# Patient Record
Sex: MALE | Race: OTHER | Employment: UNEMPLOYED | URBAN - METROPOLITAN AREA
[De-identification: names, ages, dates, MRNs, and addresses within clinical notes are randomized per-mention and may not be internally consistent; named-entity substitution may affect disease eponyms.]

---

## 2020-11-14 ENCOUNTER — HOSPITAL ENCOUNTER (EMERGENCY)
Age: 30
Discharge: HOME | End: 2020-11-14
Attending: EMERGENCY MEDICINE

## 2020-11-14 ENCOUNTER — APPOINTMENT (OUTPATIENT)
Dept: GENERAL RADIOLOGY | Age: 30
End: 2020-11-14

## 2020-11-14 VITALS
BODY MASS INDEX: 38.36 KG/M2 | RESPIRATION RATE: 16 BRPM | WEIGHT: 315 LBS | HEIGHT: 76 IN | TEMPERATURE: 98.9 F | OXYGEN SATURATION: 97 % | SYSTOLIC BLOOD PRESSURE: 138 MMHG | DIASTOLIC BLOOD PRESSURE: 93 MMHG | HEART RATE: 94 BPM

## 2020-11-14 LAB
ANION GAP SERPL CALCULATED.3IONS-SCNC: 10 MMOL/L (ref 7–16)
BASOPHILS ABSOLUTE: 0.06 E9/L (ref 0–0.2)
BASOPHILS RELATIVE PERCENT: 0.7 % (ref 0–2)
BETA-HYDROXYBUTYRATE: 0.13 MMOL/L (ref 0.02–0.27)
BUN BLDV-MCNC: 12 MG/DL (ref 6–20)
CALCIUM SERPL-MCNC: 9.4 MG/DL (ref 8.6–10.2)
CHLORIDE BLD-SCNC: 98 MMOL/L (ref 98–107)
CHP ED QC CHECK: NORMAL
CO2: 26 MMOL/L (ref 22–29)
CREAT SERPL-MCNC: 0.7 MG/DL (ref 0.7–1.2)
EOSINOPHILS ABSOLUTE: 0.21 E9/L (ref 0.05–0.5)
EOSINOPHILS RELATIVE PERCENT: 2.5 % (ref 0–6)
GFR AFRICAN AMERICAN: >60
GFR NON-AFRICAN AMERICAN: >60 ML/MIN/1.73
GLUCOSE BLD-MCNC: 351 MG/DL
GLUCOSE BLD-MCNC: 355 MG/DL (ref 74–99)
HCT VFR BLD CALC: 41.9 % (ref 37–54)
HEMOGLOBIN: 14.3 G/DL (ref 12.5–16.5)
IMMATURE GRANULOCYTES #: 0.04 E9/L
IMMATURE GRANULOCYTES %: 0.5 % (ref 0–5)
LACTIC ACID: 1 MMOL/L (ref 0.5–2.2)
LIPASE: 38 U/L (ref 13–60)
LYMPHOCYTES ABSOLUTE: 3.47 E9/L (ref 1.5–4)
LYMPHOCYTES RELATIVE PERCENT: 41.7 % (ref 20–42)
MCH RBC QN AUTO: 27.9 PG (ref 26–35)
MCHC RBC AUTO-ENTMCNC: 34.1 % (ref 32–34.5)
MCV RBC AUTO: 81.8 FL (ref 80–99.9)
METER GLUCOSE: 266 MG/DL (ref 74–99)
METER GLUCOSE: 351 MG/DL (ref 74–99)
MONOCYTES ABSOLUTE: 0.54 E9/L (ref 0.1–0.95)
MONOCYTES RELATIVE PERCENT: 6.5 % (ref 2–12)
NEUTROPHILS ABSOLUTE: 4.01 E9/L (ref 1.8–7.3)
NEUTROPHILS RELATIVE PERCENT: 48.1 % (ref 43–80)
PDW BLD-RTO: 13.1 FL (ref 11.5–15)
PH VENOUS: 7.39 (ref 7.35–7.45)
PLATELET # BLD: 304 E9/L (ref 130–450)
PMV BLD AUTO: 11.2 FL (ref 7–12)
POTASSIUM REFLEX MAGNESIUM: 3.7 MMOL/L (ref 3.5–5)
RBC # BLD: 5.12 E12/L (ref 3.8–5.8)
SODIUM BLD-SCNC: 134 MMOL/L (ref 132–146)
TROPONIN: <0.01 NG/ML (ref 0–0.03)
WBC # BLD: 8.3 E9/L (ref 4.5–11.5)

## 2020-11-14 PROCEDURE — 84484 ASSAY OF TROPONIN QUANT: CPT

## 2020-11-14 PROCEDURE — 85025 COMPLETE CBC W/AUTO DIFF WBC: CPT

## 2020-11-14 PROCEDURE — 93005 ELECTROCARDIOGRAM TRACING: CPT | Performed by: STUDENT IN AN ORGANIZED HEALTH CARE EDUCATION/TRAINING PROGRAM

## 2020-11-14 PROCEDURE — 80048 BASIC METABOLIC PNL TOTAL CA: CPT

## 2020-11-14 PROCEDURE — 82962 GLUCOSE BLOOD TEST: CPT

## 2020-11-14 PROCEDURE — 71045 X-RAY EXAM CHEST 1 VIEW: CPT

## 2020-11-14 PROCEDURE — 99283 EMERGENCY DEPT VISIT LOW MDM: CPT

## 2020-11-14 PROCEDURE — 83690 ASSAY OF LIPASE: CPT

## 2020-11-14 PROCEDURE — 82800 BLOOD PH: CPT

## 2020-11-14 PROCEDURE — 82010 KETONE BODYS QUAN: CPT

## 2020-11-14 PROCEDURE — 83605 ASSAY OF LACTIC ACID: CPT

## 2020-11-14 RX ORDER — 0.9 % SODIUM CHLORIDE 0.9 %
1000 INTRAVENOUS SOLUTION INTRAVENOUS ONCE
Status: DISCONTINUED | OUTPATIENT
Start: 2020-11-14 | End: 2020-11-14 | Stop reason: HOSPADM

## 2020-11-14 RX ORDER — GLIPIZIDE 10 MG/1
10 TABLET ORAL
COMMUNITY
End: 2020-11-14 | Stop reason: SDUPTHER

## 2020-11-14 RX ORDER — GLIPIZIDE 10 MG/1
10 TABLET ORAL
Qty: 60 TABLET | Refills: 0 | Status: SHIPPED | OUTPATIENT
Start: 2020-11-14

## 2020-11-14 ASSESSMENT — PAIN DESCRIPTION - ORIENTATION: ORIENTATION: RIGHT

## 2020-11-14 ASSESSMENT — PAIN SCALES - GENERAL: PAINLEVEL_OUTOF10: 8

## 2020-11-14 ASSESSMENT — PAIN DESCRIPTION - PAIN TYPE: TYPE: ACUTE PAIN

## 2020-11-14 ASSESSMENT — ENCOUNTER SYMPTOMS
SHORTNESS OF BREATH: 0
NAUSEA: 0
ABDOMINAL PAIN: 0
COUGH: 0
WHEEZING: 0
BACK PAIN: 1
VOMITING: 0
EYE PAIN: 0
RHINORRHEA: 0
DIARRHEA: 0

## 2020-11-14 ASSESSMENT — PAIN DESCRIPTION - DESCRIPTORS: DESCRIPTORS: STABBING

## 2020-11-14 NOTE — ED PROVIDER NOTES
Meryle Glow is a 31 y.o. male presenting to the ED for hyperglycemia. Patient states he has been out of his Metformin and insulin for 3 weeks. His physician is in [facility] and is unsure if he has type I or type 2 diabetes. He states his glucoses been running in the 300s and was concerned today because he was in 400s. Patient is Malay-speaking so a  was used. He also admits to diffuse chest pain that radiates to his back and trapezoid muscles. He lifts a lot at his job and states his pain is made worse in these areas when he is lifting things or with palpation of his chest or back. The complaint is mild in severity. They deny alleviating factors. Patient has a medical history as listed below:   Past Medical History:   Diagnosis Date    Asthma      There are no active problems to display for this patient. Review of Systems   Constitutional: Negative for chills and fever. HENT: Negative for congestion and rhinorrhea. Eyes: Negative for pain and visual disturbance. Respiratory: Negative for cough, shortness of breath and wheezing. Cardiovascular: Positive for chest pain (Pectoral pain). Negative for leg swelling. Gastrointestinal: Negative for abdominal pain, diarrhea, nausea and vomiting. Endocrine:        Hyperglycemia   Genitourinary: Negative for dysuria, frequency and hematuria. Musculoskeletal: Positive for back pain and myalgias (Back musculature pain). Negative for arthralgias and neck pain. Neurological: Negative for numbness and headaches. Physical Exam  Vitals signs and nursing note reviewed. Constitutional:       General: He is not in acute distress. Appearance: He is well-developed. HENT:      Head: Normocephalic and atraumatic. Nose: Nose normal.      Mouth/Throat:      Pharynx: Oropharynx is clear. Eyes:      Conjunctiva/sclera: Conjunctivae normal.      Pupils: Pupils are equal, round, and reactive to light. Neck:      Musculoskeletal: Normal range of motion. Thyroid: No thyromegaly. Vascular: No JVD. Cardiovascular:      Rate and Rhythm: Normal rate and regular rhythm. Heart sounds: Normal heart sounds. Pulmonary:      Effort: Pulmonary effort is normal. No respiratory distress. Breath sounds: Normal breath sounds. No wheezing, rhonchi or rales. Chest:      Chest wall: No deformity, swelling, tenderness, crepitus or edema. Breasts:         Right: Tenderness present. Left: Tenderness present. Abdominal:      General: Abdomen is flat. There is no distension. Palpations: Abdomen is soft. There is no mass. Tenderness: There is no abdominal tenderness. There is no guarding or rebound. Musculoskeletal:         General: Tenderness present. Right shoulder: He exhibits tenderness and pain. He exhibits normal range of motion, no bony tenderness, no swelling, no effusion, no crepitus, no deformity and normal strength. Left shoulder: He exhibits decreased range of motion, tenderness and pain. He exhibits no bony tenderness, no swelling, no effusion, no crepitus, no deformity, no laceration and normal strength. Skin:     General: Skin is warm and dry. Findings: No rash. Neurological:      Mental Status: He is alert. Mental status is at baseline. Procedures     MDM           Patient presents to the ED for evaluation. This patient was seen and evaluated with the attending. Work-up was performed with concerns for but not limited to DKA, medication noncompliance, infection. Musculoskeletal pain. Patient's chest pain and his back pain are related to his new job and him lifting. His pain is made worse with lifting as he stated at bedside. He was tender to the musculature where he stated he had pain. He was worked up with concern for DKA. He was finally be hyperglycemic.   Since he does not have an established physician we have had him follow-up with her primary care service after giving him fluids here in the ED to help lower his blood sugar. He was then given prescriptions since he ran out of his since he ran out of his [place] to carry him over until he can establish care here in the area. Patient continues to be non-toxic on re-evaluation. Findings were discussed with the patient and reasons to immediately return to the ED were articulated to them. They will follow-up with their PMD.     EKG read by me. Normal sinus rhythm. Nonspecific ST-T wave abnormalities. No STEMI. No previous EKG for comparison. --------------------------------------------- PAST HISTORY ---------------------------------------------  Past Medical History:  has a past medical history of Asthma. Past Surgical History:  has a past surgical history that includes fracture surgery. Social History:  reports that he has never smoked. He has never used smokeless tobacco. He reports previous alcohol use. He reports that he does not use drugs. Family History: family history is not on file. The patients home medications have been reviewed. Allergies: Contrast [iodides];  Demerol hcl [meperidine]; and Iodine-methyl salicylate    -------------------------------------------------- RESULTS -------------------------------------------------  Labs:  Results for orders placed or performed during the hospital encounter of 11/14/20   CBC Auto Differential   Result Value Ref Range    WBC 8.3 4.5 - 11.5 E9/L    RBC 5.12 3.80 - 5.80 E12/L    Hemoglobin 14.3 12.5 - 16.5 g/dL    Hematocrit 41.9 37.0 - 54.0 %    MCV 81.8 80.0 - 99.9 fL    MCH 27.9 26.0 - 35.0 pg    MCHC 34.1 32.0 - 34.5 %    RDW 13.1 11.5 - 15.0 fL    Platelets 638 956 - 573 E9/L    MPV 11.2 7.0 - 12.0 fL    Neutrophils % 48.1 43.0 - 80.0 %    Immature Granulocytes % 0.5 0.0 - 5.0 %    Lymphocytes % 41.7 20.0 - 42.0 %    Monocytes % 6.5 2.0 - 12.0 %    Eosinophils % 2.5 0.0 - 6.0 %    Basophils % 0.7 0.0 - 2.0 % Resp 16   Ht 6' 4\" (1.93 m)   Wt (!) 320 lb (145.2 kg)   SpO2 97%   BMI 38.95 kg/m²           --------------------------------- ADDITIONAL PROVIDER NOTES ---------------------------------  At this time the patient is without objective evidence of an acute process requiring hospitalization or inpatient management. They have remained hemodynamically stable throughout their entire ED visit and are stable for discharge with outpatient follow-up. The plan has been discussed in detail and they are aware of the specific conditions for emergent return, as well as the importance of follow-up. Current Discharge Medication List          Diagnosis:  1. Hyperglycemia        Disposition:  Patient's disposition: Discharge  Patient's condition is stable. NOTE:  This report was transcribed using voice recognition software. Efforts were made to ensure accuracy; however, inadvertent computerized transcription errors may be present.         Ann-Marie Patterson, DO  Resident  11/14/20 [id number]

## 2020-11-15 LAB
EKG ATRIAL RATE: 81 BPM
EKG P AXIS: 62 DEGREES
EKG P-R INTERVAL: 182 MS
EKG Q-T INTERVAL: 350 MS
EKG QRS DURATION: 70 MS
EKG QTC CALCULATION (BAZETT): 406 MS
EKG R AXIS: 41 DEGREES
EKG T AXIS: 7 DEGREES
EKG VENTRICULAR RATE: 81 BPM

## 2021-01-31 ENCOUNTER — HOSPITAL ENCOUNTER (EMERGENCY)
Age: 31
Discharge: HOME | End: 2021-02-01
Attending: EMERGENCY MEDICINE
Payer: OTHER GOVERNMENT

## 2021-01-31 ENCOUNTER — APPOINTMENT (OUTPATIENT)
Dept: CT IMAGING | Age: 31
End: 2021-01-31
Payer: OTHER GOVERNMENT

## 2021-01-31 ENCOUNTER — APPOINTMENT (OUTPATIENT)
Dept: GENERAL RADIOLOGY | Age: 31
End: 2021-01-31
Payer: OTHER GOVERNMENT

## 2021-01-31 DIAGNOSIS — U07.1 COVID-19: Primary | ICD-10-CM

## 2021-01-31 DIAGNOSIS — R11.0 NAUSEA: ICD-10-CM

## 2021-01-31 DIAGNOSIS — J45.21 MILD INTERMITTENT ASTHMA WITH EXACERBATION: ICD-10-CM

## 2021-01-31 LAB
ALBUMIN SERPL-MCNC: 3.6 G/DL (ref 3.5–5.2)
ALP BLD-CCNC: 125 U/L (ref 40–129)
ALT SERPL-CCNC: 35 U/L (ref 0–40)
ANION GAP SERPL CALCULATED.3IONS-SCNC: 13 MMOL/L (ref 7–16)
AST SERPL-CCNC: 47 U/L (ref 0–39)
BASOPHILS ABSOLUTE: 0.01 E9/L (ref 0–0.2)
BASOPHILS RELATIVE PERCENT: 0.1 % (ref 0–2)
BETA-HYDROXYBUTYRATE: 0.63 MMOL/L (ref 0.02–0.27)
BILIRUB SERPL-MCNC: 0.5 MG/DL (ref 0–1.2)
BILIRUBIN DIRECT: <0.2 MG/DL (ref 0–0.3)
BILIRUBIN, INDIRECT: ABNORMAL MG/DL (ref 0–1)
BUN BLDV-MCNC: 9 MG/DL (ref 6–20)
CALCIUM SERPL-MCNC: 8.7 MG/DL (ref 8.6–10.2)
CHLORIDE BLD-SCNC: 95 MMOL/L (ref 98–107)
CO2: 21 MMOL/L (ref 22–29)
CREAT SERPL-MCNC: 0.7 MG/DL (ref 0.7–1.2)
EOSINOPHILS ABSOLUTE: 0.01 E9/L (ref 0.05–0.5)
EOSINOPHILS RELATIVE PERCENT: 0.1 % (ref 0–6)
GFR AFRICAN AMERICAN: >60
GFR NON-AFRICAN AMERICAN: >60 ML/MIN/1.73
GLUCOSE BLD-MCNC: 325 MG/DL (ref 74–99)
HCT VFR BLD CALC: 42 % (ref 37–54)
HEMOGLOBIN: 13.8 G/DL (ref 12.5–16.5)
IMMATURE GRANULOCYTES #: 0.04 E9/L
IMMATURE GRANULOCYTES %: 0.5 % (ref 0–5)
LIPASE: 35 U/L (ref 13–60)
LYMPHOCYTES ABSOLUTE: 1.63 E9/L (ref 1.5–4)
LYMPHOCYTES RELATIVE PERCENT: 20.2 % (ref 20–42)
MCH RBC QN AUTO: 26.4 PG (ref 26–35)
MCHC RBC AUTO-ENTMCNC: 32.9 % (ref 32–34.5)
MCV RBC AUTO: 80.3 FL (ref 80–99.9)
MONOCYTES ABSOLUTE: 0.33 E9/L (ref 0.1–0.95)
MONOCYTES RELATIVE PERCENT: 4.1 % (ref 2–12)
NEUTROPHILS ABSOLUTE: 6.05 E9/L (ref 1.8–7.3)
NEUTROPHILS RELATIVE PERCENT: 75 % (ref 43–80)
PDW BLD-RTO: 12.7 FL (ref 11.5–15)
PH VENOUS: 7.53 (ref 7.35–7.45)
PLATELET # BLD: 301 E9/L (ref 130–450)
PMV BLD AUTO: 10.9 FL (ref 7–12)
POTASSIUM SERPL-SCNC: 3.8 MMOL/L (ref 3.5–5)
PRO-BNP: 31 PG/ML (ref 0–125)
RBC # BLD: 5.23 E12/L (ref 3.8–5.8)
SARS-COV-2, NAAT: DETECTED
SODIUM BLD-SCNC: 129 MMOL/L (ref 132–146)
TOTAL PROTEIN: 8.2 G/DL (ref 6.4–8.3)
TROPONIN: <0.01 NG/ML (ref 0–0.03)
WBC # BLD: 8.1 E9/L (ref 4.5–11.5)

## 2021-01-31 PROCEDURE — 96372 THER/PROPH/DIAG INJ SC/IM: CPT

## 2021-01-31 PROCEDURE — 6360000002 HC RX W HCPCS: Performed by: EMERGENCY MEDICINE

## 2021-01-31 PROCEDURE — 82800 BLOOD PH: CPT

## 2021-01-31 PROCEDURE — 96365 THER/PROPH/DIAG IV INF INIT: CPT

## 2021-01-31 PROCEDURE — 93005 ELECTROCARDIOGRAM TRACING: CPT | Performed by: EMERGENCY MEDICINE

## 2021-01-31 PROCEDURE — 99284 EMERGENCY DEPT VISIT MOD MDM: CPT

## 2021-01-31 PROCEDURE — 96375 TX/PRO/DX INJ NEW DRUG ADDON: CPT

## 2021-01-31 PROCEDURE — 80048 BASIC METABOLIC PNL TOTAL CA: CPT

## 2021-01-31 PROCEDURE — 6360000004 HC RX CONTRAST MEDICATION: Performed by: RADIOLOGY

## 2021-01-31 PROCEDURE — 85025 COMPLETE CBC W/AUTO DIFF WBC: CPT

## 2021-01-31 PROCEDURE — 80076 HEPATIC FUNCTION PANEL: CPT

## 2021-01-31 PROCEDURE — 6370000000 HC RX 637 (ALT 250 FOR IP): Performed by: EMERGENCY MEDICINE

## 2021-01-31 PROCEDURE — 84484 ASSAY OF TROPONIN QUANT: CPT

## 2021-01-31 PROCEDURE — 94664 DEMO&/EVAL PT USE INHALER: CPT

## 2021-01-31 PROCEDURE — 83690 ASSAY OF LIPASE: CPT

## 2021-01-31 PROCEDURE — 83880 ASSAY OF NATRIURETIC PEPTIDE: CPT

## 2021-01-31 PROCEDURE — 82010 KETONE BODYS QUAN: CPT

## 2021-01-31 PROCEDURE — 71275 CT ANGIOGRAPHY CHEST: CPT

## 2021-01-31 PROCEDURE — 71045 X-RAY EXAM CHEST 1 VIEW: CPT

## 2021-01-31 PROCEDURE — U0002 COVID-19 LAB TEST NON-CDC: HCPCS

## 2021-01-31 RX ORDER — ALBUTEROL SULFATE 0.63 MG/3ML
1 SOLUTION RESPIRATORY (INHALATION) EVERY 6 HOURS PRN
COMMUNITY

## 2021-01-31 RX ORDER — MAGNESIUM SULFATE IN WATER 40 MG/ML
2000 INJECTION, SOLUTION INTRAVENOUS ONCE
Status: COMPLETED | OUTPATIENT
Start: 2021-01-31 | End: 2021-01-31

## 2021-01-31 RX ORDER — DIPHENHYDRAMINE HYDROCHLORIDE 50 MG/ML
25 INJECTION INTRAMUSCULAR; INTRAVENOUS ONCE
Status: COMPLETED | OUTPATIENT
Start: 2021-01-31 | End: 2021-01-31

## 2021-01-31 RX ORDER — ACETAMINOPHEN 500 MG
1000 TABLET ORAL ONCE
Status: COMPLETED | OUTPATIENT
Start: 2021-01-31 | End: 2021-01-31

## 2021-01-31 RX ORDER — ALBUTEROL SULFATE 90 UG/1
2 AEROSOL, METERED RESPIRATORY (INHALATION) ONCE
Status: DISCONTINUED | OUTPATIENT
Start: 2021-01-31 | End: 2021-01-31 | Stop reason: RX

## 2021-01-31 RX ORDER — METHYLPREDNISOLONE SODIUM SUCCINATE 125 MG/2ML
80 INJECTION, POWDER, LYOPHILIZED, FOR SOLUTION INTRAMUSCULAR; INTRAVENOUS ONCE
Status: COMPLETED | OUTPATIENT
Start: 2021-01-31 | End: 2021-01-31

## 2021-01-31 RX ORDER — SODIUM CHLORIDE 0.9 % (FLUSH) 0.9 %
10 SYRINGE (ML) INJECTION PRN
Status: DISCONTINUED | OUTPATIENT
Start: 2021-01-31 | End: 2021-02-01 | Stop reason: HOSPADM

## 2021-01-31 RX ADMIN — DIPHENHYDRAMINE HYDROCHLORIDE 25 MG: 50 INJECTION, SOLUTION INTRAMUSCULAR; INTRAVENOUS at 21:40

## 2021-01-31 RX ADMIN — INSULIN HUMAN 10 UNITS: 100 INJECTION, SOLUTION PARENTERAL at 23:21

## 2021-01-31 RX ADMIN — METHYLPREDNISOLONE SODIUM SUCCINATE 80 MG: 125 INJECTION, POWDER, FOR SOLUTION INTRAMUSCULAR; INTRAVENOUS at 21:40

## 2021-01-31 RX ADMIN — MAGNESIUM SULFATE HEPTAHYDRATE 2000 MG: 40 INJECTION, SOLUTION INTRAVENOUS at 21:11

## 2021-01-31 RX ADMIN — IOPAMIDOL 75 ML: 755 INJECTION, SOLUTION INTRAVENOUS at 22:01

## 2021-01-31 RX ADMIN — IPRATROPIUM BROMIDE AND ALBUTEROL 2 PUFF: 20; 100 SPRAY, METERED RESPIRATORY (INHALATION) at 21:36

## 2021-01-31 RX ADMIN — LIDOCAINE HYDROCHLORIDE: 20 SOLUTION ORAL; TOPICAL at 22:42

## 2021-01-31 RX ADMIN — ACETAMINOPHEN 1000 MG: 500 TABLET ORAL at 21:11

## 2021-01-31 ASSESSMENT — ENCOUNTER SYMPTOMS
ABDOMINAL PAIN: 1
COUGH: 0
WHEEZING: 0
EYE REDNESS: 0
SORE THROAT: 0
NAUSEA: 1
VOMITING: 1
BACK PAIN: 0
HEMOPTYSIS: 0
SPUTUM PRODUCTION: 0
DIARRHEA: 0
SINUS PRESSURE: 0
SHORTNESS OF BREATH: 1
EYE PAIN: 0
EYE DISCHARGE: 0

## 2021-02-01 VITALS
TEMPERATURE: 99.9 F | HEART RATE: 92 BPM | BODY MASS INDEX: 38.36 KG/M2 | HEIGHT: 76 IN | DIASTOLIC BLOOD PRESSURE: 62 MMHG | SYSTOLIC BLOOD PRESSURE: 116 MMHG | RESPIRATION RATE: 22 BRPM | WEIGHT: 315 LBS | OXYGEN SATURATION: 93 %

## 2021-02-01 LAB
EKG ATRIAL RATE: 98 BPM
EKG P AXIS: 32 DEGREES
EKG P-R INTERVAL: 142 MS
EKG Q-T INTERVAL: 346 MS
EKG QRS DURATION: 80 MS
EKG QTC CALCULATION (BAZETT): 441 MS
EKG R AXIS: 19 DEGREES
EKG T AXIS: -12 DEGREES
EKG VENTRICULAR RATE: 98 BPM
METER GLUCOSE: 318 MG/DL (ref 74–99)

## 2021-02-01 PROCEDURE — 99284 EMERGENCY DEPT VISIT MOD MDM: CPT

## 2021-02-01 PROCEDURE — 82962 GLUCOSE BLOOD TEST: CPT

## 2021-02-01 RX ORDER — ONDANSETRON 4 MG/1
4 TABLET, ORALLY DISINTEGRATING ORAL EVERY 8 HOURS PRN
Qty: 10 TABLET | Refills: 0 | Status: SHIPPED | OUTPATIENT
Start: 2021-02-01 | End: 2021-02-01 | Stop reason: SDUPTHER

## 2021-02-01 RX ORDER — ONDANSETRON 4 MG/1
4 TABLET, ORALLY DISINTEGRATING ORAL EVERY 8 HOURS PRN
Qty: 10 TABLET | Refills: 0 | Status: SHIPPED | OUTPATIENT
Start: 2021-02-01 | End: 2022-02-01

## 2021-02-01 NOTE — ED NOTES
Pt given d/c instructions. Pt to f.u with pcp in 3 days. Pt given instructions on when to return to ED. Pt verbalized understanding of instructions. Pt left in stable and ambulatory condition. Pt IV d/c without any complications.      Maria E Prince RN  02/01/21 [id number]

## 2021-02-01 NOTE — ED NOTES
PT was 94% on RA while ambulating and HR was 105-110 while ambulating at this time. Pt states that there was pain in his chest while ambulating.      Alondra Feliz RN  01/31/21 [id number]

## 2021-02-01 NOTE — ED PROVIDER NOTES
23-year-old male presents to the emergency department with multiple complaints most notably shortness of breath nausea and emesis. Patient has a history of diabetes he is on Metformin and glyburide for this. He also history of asthma takes an albuterol inhaler. He states for the last 5 days he has had increased shortness of breath nausea and vomiting can only keep down water. He states no sick contacts. He states no fevers no chills no chest pain. He does point to some epigastric abdominal pain. Denies urinary symptoms or leg swelling. He does admit that he is used his inhaler multiple times most recently at noon today. It does not provide a great deal benefit. Patient did report to nursing staff that he has a history of pulmonary emboli in the past.    The history is provided by the patient. A  was used. Shortness of Breath  Severity:  Mild  Onset quality:  Gradual  Duration:  5 days  Timing:  Intermittent  Progression:  Waxing and waning  Chronicity:  New  Relieved by:  Nothing  Worsened by:  Nothing  Ineffective treatments:  None tried  Associated symptoms: abdominal pain and vomiting    Associated symptoms: no chest pain, no cough, no diaphoresis, no ear pain, no fever, no headaches, no hemoptysis, no rash, no sore throat, no sputum production and no wheezing         Review of Systems   Constitutional: Negative for chills, diaphoresis and fever. HENT: Negative for ear pain, sinus pressure and sore throat. Eyes: Negative for pain, discharge and redness. Respiratory: Positive for shortness of breath. Negative for cough, hemoptysis, sputum production and wheezing. Cardiovascular: Negative for chest pain. Gastrointestinal: Positive for abdominal pain, nausea and vomiting. Negative for diarrhea. Genitourinary: Negative for dysuria and frequency. Musculoskeletal: Negative for arthralgias and back pain. Skin: Negative for rash and wound.    Neurological: Negative for weakness and headaches. Hematological: Negative for adenopathy. All other systems reviewed and are negative. Physical Exam  Constitutional:       Appearance: He is well-developed. He is obese. HENT:      Head: Normocephalic and atraumatic. Neck:      Musculoskeletal: Normal range of motion. Cardiovascular:      Rate and Rhythm: Regular rhythm. Tachycardia present. Pulmonary:      Effort: Tachypnea present. Breath sounds: Wheezing present. Abdominal:      General: Bowel sounds are normal.      Palpations: Abdomen is soft. Tenderness: There is abdominal tenderness in the epigastric area. Neurological:      General: No focal deficit present. Mental Status: He is alert and oriented to person, place, and time. Procedures     MDM  Number of Diagnoses or Management Options  COVID-19  Mild intermittent asthma with exacerbation  Nausea  Diagnosis management comments: Patient seen and examined. Concern is for COVID-19. Patient also noted to be in what appears to be a mild exam exacerbation of asthma. Oxygen sats between 93 and 96%. Labs and imaging were ordered a CT of the chest was performed to rule out PE and was found to be negative. Patient is found to have CT findings for COVID-19. He was given a GI cocktail and was having improvement of symptoms with that the patient's work-up ultimately reveals that he was hyperglycemic and treated with insulin. There was improvement of sugar and he was felt safe for discharge with outpatient follow-up after an being ambulated twice  was used throughout care. Patient discharged with Combivent inhaler and Zofran and recommended follow-up with her primary care physician and I have instructed where this is in his discharge instructions. He is agreeable with plan given strict return precautions if symptoms were to worsen and was discharged. ED Course as of Feb 01 0055   Juanice Lunch Jan 31, 2021   2107 EKG:   This EKG is signed and interpreted by the EP. Time: 2104  Rate: 98  Rhythm: Sinus  Interpretation: NSR, nonspecific st changes  Comparison: stable as compared to patient's most recent EKG      [CF]      ED Course User Index  [CF] Judge Leisa DO        --------------------------------------------- PAST HISTORY ---------------------------------------------  Past Medical History:  has a past medical history of Asthma. Past Surgical History:  has a past surgical history that includes fracture surgery. Social History:  reports that he has never smoked. He has never used smokeless tobacco. He reports previous alcohol use. He reports that he does not use drugs. Family History: family history is not on file. The patients home medications have been reviewed.     Allergies: Contrast [iodides], Demerol hcl [meperidine], and Iodine-methyl salicylate    -------------------------------------------------- RESULTS -------------------------------------------------  Labs:  Results for orders placed or performed during the hospital encounter of 01/31/21   CBC Auto Differential   Result Value Ref Range    WBC 8.1 4.5 - 11.5 E9/L    RBC 5.23 3.80 - 5.80 E12/L    Hemoglobin 13.8 12.5 - 16.5 g/dL    Hematocrit 42.0 37.0 - 54.0 %    MCV 80.3 80.0 - 99.9 fL    MCH 26.4 26.0 - 35.0 pg    MCHC 32.9 32.0 - 34.5 %    RDW 12.7 11.5 - 15.0 fL    Platelets 220 973 - 227 E9/L    MPV 10.9 7.0 - 12.0 fL    Neutrophils % 75.0 43.0 - 80.0 %    Immature Granulocytes % 0.5 0.0 - 5.0 %    Lymphocytes % 20.2 20.0 - 42.0 %    Monocytes % 4.1 2.0 - 12.0 %    Eosinophils % 0.1 0.0 - 6.0 %    Basophils % 0.1 0.0 - 2.0 %    Neutrophils Absolute 6.05 1.80 - 7.30 E9/L    Immature Granulocytes # 0.04 E9/L    Lymphocytes Absolute 1.63 1.50 - 4.00 E9/L    Monocytes Absolute 0.33 0.10 - 0.95 E9/L    Eosinophils Absolute 0.01 (L) 0.05 - 0.50 E9/L    Basophils Absolute 0.01 0.00 - 0.20 H9/I   Basic Metabolic Panel   Result Value Ref Range    Sodium 129 (L) 132 - 146 mmol/L    Potassium 3.8 3.5 - 5.0 mmol/L    Chloride 95 (L) 98 - 107 mmol/L    CO2 21 (L) 22 - 29 mmol/L    Anion Gap 13 7 - 16 mmol/L    Glucose 325 (H) 74 - 99 mg/dL    BUN 9 6 - 20 mg/dL    CREATININE 0.7 0.7 - 1.2 mg/dL    GFR Non-African American >60 >=60 mL/min/1.73    GFR African American >60     Calcium 8.7 8.6 - 10.2 mg/dL   Hepatic Function Panel   Result Value Ref Range    Total Protein 8.2 6.4 - 8.3 g/dL    Albumin 3.6 3.5 - 5.2 g/dL    Alkaline Phosphatase 125 40 - 129 U/L    ALT 35 0 - 40 U/L    AST 47 (H) 0 - 39 U/L    Total Bilirubin 0.5 0.0 - 1.2 mg/dL    Bilirubin, Direct <0.2 0.0 - 0.3 mg/dL    Bilirubin, Indirect see below 0.0 - 1.0 mg/dL   Beta-Hydroxybutyrate   Result Value Ref Range    Beta-Hydroxybutyrate 0.63 (H) 0.02 - 0.27 mmol/L   pH, venous   Result Value Ref Range    pH, Dayron 7.53 (H) 7.35 - 7.45   Troponin   Result Value Ref Range    Troponin <0.01 0.00 - 0.03 ng/mL   Brain Natriuretic Peptide   Result Value Ref Range    Pro-BNP 31 0 - 125 pg/mL   COVID-19   Result Value Ref Range    SARS-CoV-2, NAAT DETECTED (A) Not Detected   Lipase   Result Value Ref Range    Lipase 35 13 - 60 U/L   POCT Glucose   Result Value Ref Range    Meter Glucose 318 (H) 74 - 99 mg/dL   EKG 12 Lead   Result Value Ref Range    Ventricular Rate 98 BPM    Atrial Rate 98 BPM    P-R Interval 142 ms    QRS Duration 80 ms    Q-T Interval 346 ms    QTc Calculation (Bazett) 441 ms    P Axis 32 degrees    R Axis 19 degrees    T Axis -12 degrees       Radiology:  CTA PULMONARY W CONTRAST   Final Result   No gross evidence of an acute pulmonary embolus. Multifocal ground-glass opacities involving all lobes of both lungs. Findings are most consistent with multifocal atypical or viral pneumonia,   likely COVID-19 pneumonia. Commonly reported imaging features of COVID-19 pneumonia are present.   Other   processes such as influenza pneumonia and organizing pneumonia, as can be   seen with drug toxicity and connective tissue disease, can cause a similar   imaging pattern. PneTyp   Mild bilateral hilar and mediastinal adenopathy, likely reactive. XR CHEST PORTABLE   Final Result   Bilateral ill-defined opacities in the lungs. No pleural effusion or   pneumothorax. Imaging features concerning for infection. RECOMMENDATION:   (Recommend upright PA and lateral chest radiographs 6-8 weeks after   resolution of patient's symptoms to ensure complete resolution of   radiographic findings. )          ------------------------- NURSING NOTES AND VITALS REVIEWED ---------------------------  Date / Time Roomed:  1/31/2021  8:42 PM  ED Bed Assignment:  05/05    The nursing notes within the ED encounter and vital signs as below have been reviewed. /62   Pulse 92   Temp 99.9 °F (37.7 °C)   Resp 22   Ht 6' 4\" (1.93 m)   Wt (!) 345 lb (156.5 kg)   SpO2 93%   BMI 41.99 kg/m²   Oxygen Saturation Interpretation: Normal      ------------------------------------------ PROGRESS NOTES ------------------------------------------  I have spoken with the patient and discussed todays results, in addition to providing specific details for the plan of care and counseling regarding the diagnosis and prognosis. Their questions are answered at this time and they are agreeable with the plan. I discussed at length with them reasons for immediate return here for re evaluation. They will followup with their primary care physician by calling their office on Monday.      --------------------------------- ADDITIONAL PROVIDER NOTES ---------------------------------  At this time the patient is without objective evidence of an acute process requiring hospitalization or inpatient management. They have remained hemodynamically stable throughout their entire ED visit and are stable for discharge with outpatient follow-up.      The plan has been discussed in detail and they are aware of the specific conditions for emergent return, as well as the importance of follow-up. Discharge Medication List as of 2/1/2021 12:29 AM          Diagnosis:  1. COVID-19    2. Mild intermittent asthma with exacerbation    3. Nausea        Disposition:  Patient's disposition: Discharge to home  Patient's condition is stable.          Faraz Macedo DO  02/01/21 [id number]

## 2021-02-02 ENCOUNTER — CARE COORDINATION (OUTPATIENT)
Dept: CARE COORDINATION | Age: 31
End: 2021-02-02

## 2021-02-02 NOTE — CARE COORDINATION
Date/Time:  2/2/2021 11:28 AM  Attempted to reach patient by telephone. Call within 2 business days of discharge: Yes Left HIPPA compliant message requesting a return call. Will attempt to reach patient again.  Called using  and left message with contact info

## 2021-02-03 NOTE — CARE COORDINATION
2nd attempt to contact pt regarding recent er visit without success. hippa compliant message left updating pt to follow up with the find a physician line regarding anything further.  Contact info given